# Patient Record
Sex: FEMALE | Race: BLACK OR AFRICAN AMERICAN | NOT HISPANIC OR LATINO | Employment: FULL TIME | URBAN - METROPOLITAN AREA
[De-identification: names, ages, dates, MRNs, and addresses within clinical notes are randomized per-mention and may not be internally consistent; named-entity substitution may affect disease eponyms.]

---

## 2017-03-14 ENCOUNTER — HOSPITAL ENCOUNTER (EMERGENCY)
Facility: HOSPITAL | Age: 52
Discharge: HOME | End: 2017-03-14
Attending: EMERGENCY MEDICINE
Payer: COMMERCIAL

## 2017-03-14 VITALS
HEIGHT: 60 IN | TEMPERATURE: 99 F | BODY MASS INDEX: 30.63 KG/M2 | SYSTOLIC BLOOD PRESSURE: 181 MMHG | RESPIRATION RATE: 18 BRPM | HEART RATE: 84 BPM | WEIGHT: 156 LBS | OXYGEN SATURATION: 97 % | DIASTOLIC BLOOD PRESSURE: 81 MMHG

## 2017-03-14 DIAGNOSIS — K52.9 ENTERITIS: Primary | ICD-10-CM

## 2017-03-14 DIAGNOSIS — N94.89 ADNEXAL MASS: ICD-10-CM

## 2017-03-14 LAB
ALBUMIN SERPL BCP-MCNC: 3.3 G/DL
ALP SERPL-CCNC: 119 U/L
ALT SERPL W/O P-5'-P-CCNC: 16 U/L
ANION GAP SERPL CALC-SCNC: 8 MMOL/L
AST SERPL-CCNC: 19 U/L
BACTERIA #/AREA URNS HPF: NORMAL /HPF
BASOPHILS # BLD AUTO: 0.02 K/UL
BASOPHILS NFR BLD: 0.2 %
BILIRUB SERPL-MCNC: 0.4 MG/DL
BILIRUB UR QL STRIP: NEGATIVE
BUN SERPL-MCNC: 6 MG/DL
CALCIUM SERPL-MCNC: 9.3 MG/DL
CHLORIDE SERPL-SCNC: 105 MMOL/L
CLARITY UR: ABNORMAL
CO2 SERPL-SCNC: 30 MMOL/L
COLOR UR: YELLOW
CREAT SERPL-MCNC: 0.8 MG/DL
DIFFERENTIAL METHOD: ABNORMAL
EOSINOPHIL # BLD AUTO: 0.3 K/UL
EOSINOPHIL NFR BLD: 3 %
ERYTHROCYTE [DISTWIDTH] IN BLOOD BY AUTOMATED COUNT: 14.3 %
EST. GFR  (AFRICAN AMERICAN): >60 ML/MIN/1.73 M^2
EST. GFR  (NON AFRICAN AMERICAN): >60 ML/MIN/1.73 M^2
GLUCOSE SERPL-MCNC: 119 MG/DL
GLUCOSE UR QL STRIP: NEGATIVE
HCT VFR BLD AUTO: 43.3 %
HGB BLD-MCNC: 14.4 G/DL
HGB UR QL STRIP: ABNORMAL
KETONES UR QL STRIP: NEGATIVE
LEUKOCYTE ESTERASE UR QL STRIP: NEGATIVE
LIPASE SERPL-CCNC: 10 U/L
LYMPHOCYTES # BLD AUTO: 2.9 K/UL
LYMPHOCYTES NFR BLD: 34.3 %
MCH RBC QN AUTO: 31.8 PG
MCHC RBC AUTO-ENTMCNC: 33.3 %
MCV RBC AUTO: 96 FL
MICROSCOPIC COMMENT: NORMAL
MONOCYTES # BLD AUTO: 0.5 K/UL
MONOCYTES NFR BLD: 5.2 %
NEUTROPHILS # BLD AUTO: 4.9 K/UL
NEUTROPHILS NFR BLD: 57.3 %
NITRITE UR QL STRIP: NEGATIVE
PH UR STRIP: 5 [PH] (ref 5–8)
PLATELET # BLD AUTO: 294 K/UL
PMV BLD AUTO: 10 FL
POTASSIUM SERPL-SCNC: 3.3 MMOL/L
PROT SERPL-MCNC: 7.7 G/DL
PROT UR QL STRIP: NEGATIVE
RBC # BLD AUTO: 4.53 M/UL
RBC #/AREA URNS HPF: 2 /HPF (ref 0–4)
SODIUM SERPL-SCNC: 143 MMOL/L
SP GR UR STRIP: 1.02 (ref 1–1.03)
SQUAMOUS #/AREA URNS HPF: 20 /HPF
URN SPEC COLLECT METH UR: ABNORMAL
UROBILINOGEN UR STRIP-ACNC: ABNORMAL EU/DL
WBC # BLD AUTO: 8.58 K/UL
WBC #/AREA URNS HPF: 2 /HPF (ref 0–5)

## 2017-03-14 PROCEDURE — 85025 COMPLETE CBC W/AUTO DIFF WBC: CPT

## 2017-03-14 PROCEDURE — 25500020 PHARM REV CODE 255: Performed by: EMERGENCY MEDICINE

## 2017-03-14 PROCEDURE — 83690 ASSAY OF LIPASE: CPT

## 2017-03-14 PROCEDURE — 99284 EMERGENCY DEPT VISIT MOD MDM: CPT

## 2017-03-14 PROCEDURE — 81000 URINALYSIS NONAUTO W/SCOPE: CPT

## 2017-03-14 PROCEDURE — 93005 ELECTROCARDIOGRAM TRACING: CPT

## 2017-03-14 PROCEDURE — 80053 COMPREHEN METABOLIC PANEL: CPT

## 2017-03-14 RX ORDER — ONDANSETRON 4 MG/1
4 TABLET, FILM COATED ORAL EVERY 12 HOURS PRN
Qty: 12 TABLET | Refills: 0 | Status: SHIPPED | OUTPATIENT
Start: 2017-03-14

## 2017-03-14 RX ORDER — DICYCLOMINE HYDROCHLORIDE 20 MG/1
20 TABLET ORAL 4 TIMES DAILY
Qty: 20 TABLET | Refills: 0 | Status: SHIPPED | OUTPATIENT
Start: 2017-03-14 | End: 2017-04-13

## 2017-03-14 RX ADMIN — IOHEXOL 100 ML: 350 INJECTION, SOLUTION INTRAVENOUS at 05:03

## 2017-03-14 NOTE — ED PROVIDER NOTES
Encounter Date: 3/14/2017       History     Chief Complaint   Patient presents with    Abdominal Pain     generalized abdominal pain since yesterday, cramping pain; also has foul smelling urine     Review of patient's allergies indicates:  No Known Allergies  HPI Comments: 57 year old female with Hx of HTN c/o generalized abd pain that began 2 days ago and one episode of vomiting. Also c/o malodorous urine. Pt denies fever/chills, diarrhea, constipation, vaginal discharge, chest pain, back pain.    The history is provided by the patient.     Past Medical History:   Diagnosis Date    GERD (gastroesophageal reflux disease)      Past Surgical History:   Procedure Laterality Date    gall stone removal      HYSTERECTOMY       History reviewed. No pertinent family history.  Social History   Substance Use Topics    Smoking status: Current Some Day Smoker     Packs/day: 0.50    Smokeless tobacco: None    Alcohol use No     Review of Systems   Constitutional: Negative for chills, fatigue and fever.   HENT: Negative for ear pain, rhinorrhea and sinus pressure.    Eyes: Negative for photophobia and pain.   Respiratory: Negative for apnea, cough, choking, chest tightness, shortness of breath, wheezing and stridor.    Cardiovascular: Negative for chest pain, palpitations and leg swelling.   Gastrointestinal: Positive for abdominal pain and vomiting. Negative for abdominal distention, blood in stool, constipation, diarrhea, nausea and rectal pain.   Genitourinary: Negative for difficulty urinating, dyspareunia, dysuria, flank pain, frequency, pelvic pain, urgency, vaginal bleeding and vaginal discharge.   Musculoskeletal: Negative for arthralgias, back pain, gait problem, joint swelling, myalgias, neck pain and neck stiffness.   Skin: Negative for color change, pallor and rash.   Neurological: Negative for dizziness, syncope and numbness.   Hematological: Negative for adenopathy.   Psychiatric/Behavioral: Negative for  agitation. The patient is not nervous/anxious.        Physical Exam   Initial Vitals   BP Pulse Resp Temp SpO2   03/14/17 1440 03/14/17 1440 03/14/17 1440 03/14/17 1440 03/14/17 1440   136/76 95 17 97.9 °F (36.6 °C) 95 %     Physical Exam    Nursing note and vitals reviewed.  Constitutional: She appears well-developed and well-nourished. She is not diaphoretic. No distress.   HENT:   Head: Normocephalic and atraumatic.   Right Ear: External ear normal.   Left Ear: External ear normal.   Nose: Nose normal.   Mouth/Throat: Oropharynx is clear and moist.   Eyes: Conjunctivae and EOM are normal. Pupils are equal, round, and reactive to light. Right eye exhibits no discharge. Left eye exhibits no discharge. No scleral icterus.   Neck: Normal range of motion. Neck supple. No thyromegaly present. No tracheal deviation present. No JVD present.   Cardiovascular: Normal rate, regular rhythm, normal heart sounds and intact distal pulses. Exam reveals no gallop and no friction rub.    No murmur heard.  Pulmonary/Chest: Breath sounds normal. No stridor. No respiratory distress. She has no wheezes. She has no rhonchi. She has no rales. She exhibits no tenderness.   Abdominal: Soft. Bowel sounds are normal. She exhibits no distension and no mass. There is tenderness (RLQ; Positive Psoas Sign). There is no rebound and no guarding.   Musculoskeletal: Normal range of motion. She exhibits no edema or tenderness.   Lymphadenopathy:     She has no cervical adenopathy.   Neurological: She is alert and oriented to person, place, and time. She has normal strength and normal reflexes. She displays normal reflexes. No cranial nerve deficit or sensory deficit.   Skin: Skin is warm and dry. No rash and no abscess noted. No erythema. No pallor.   Psychiatric: She has a normal mood and affect. Her behavior is normal. Judgment and thought content normal.         ED Course   Procedures  Labs Reviewed   CBC W/ AUTO DIFFERENTIAL - Abnormal; Notable  for the following:        Result Value    MCH 31.8 (*)     All other components within normal limits    Narrative:     For upper or mid abdominal pain.   COMPREHENSIVE METABOLIC PANEL - Abnormal; Notable for the following:     Potassium 3.3 (*)     CO2 30 (*)     Glucose 119 (*)     Albumin 3.3 (*)     All other components within normal limits    Narrative:     For upper or mid abdominal pain.   URINALYSIS - Abnormal; Notable for the following:     Appearance, UA Hazy (*)     Occult Blood UA 1+ (*)     Urobilinogen, UA 2.0-3.0 (*)     All other components within normal limits   LIPASE    Narrative:     For upper or mid abdominal pain.   URINALYSIS MICROSCOPIC     EKG Readings: (Independently Interpreted)   Rhythm: Normal Sinus Rhythm. Ectopy: PVCs. Conduction: Normal. ST Segments: Normal ST Segments. T Waves: Normal. Clinical Impression: Normal Sinus Rhythm with PVCs          Medical Decision Making:   ED Management:  This is a 52 year old female presenting with diffuse abdominal pain x 2 days and one episode of vomiting and malodorous urine. Denies fever, dysuria, diarrhea, constipation, vaginal discharge, chest pain, back pain. On exam there is RLQ abd tenderness. No CVA tenderness. No palpable intraabdominal mass. Positive Psoas Sign. Negative Natarajan's Sign. Lab results are unremarkable. CT shows enteritis and a 7.0 x 6.0 cm left adnexal mass. I suspect enteritis and adnexal mass. I considered but doubt appendicitis, adnexal torsion, PID, colitis. Instructed patient to follow up with Ob/Gyn for adnexal mass in the next 7 days. Pt discharged home with bentyl and Zofran and instructions for follow up and supportive care. ED return precautions given and understood by patient. Pt verbalized understanding of all instructions given. This case was discussed with Francis Barreto MD who agreed with the assessment and plan.  Other:   I have discussed this case with another health care provider.              Attending  Attestation:     Physician Attestation Statement for NP/PA:   I discussed this assessment and plan of this patient with the NP/PA, but I did not personally examine the patient. The face to face encounter was performed by the NP/PA.    Other NP/PA Attestation Additions:      Medical Decision Makin yo F p/w diffuse abd pain for 2 days, with vomiting x 1 yesterday, no nausea currently. On exam, RLQ TTP.  Labs unremarkable. Will CT to screen for appendicitis, colitis, other emergent abdominal pathology.     I have personally reviewed and interpreted the patient's CT abdomen and there are findings suggestive of enteritis  There is also a large left adnexal cyst.    Patient's symptoms likely secondary to nonspecific enteritis.  Will treat this supportively and also recommend follow-up with OB/GYN.                 ED Course     Clinical Impression:   The primary encounter diagnosis was Enteritis. A diagnosis of Adnexal mass was also pertinent to this visit.    Disposition:   Disposition: Discharged  Condition: Stable       Javy Diaz NP  17 [phone]       Francis Barreto III, MD  179

## 2017-03-14 NOTE — ED AVS SNAPSHOT
[facility]  [place] [address]               Destiney M Paul   3/14/2017  4:14 PM   ED    Description:  Female : 1965   Department:  [facility]           Your Care was Coordinated By:     Provider Role From To    Francis Barreto III, MD Attending Provider 17 0616 --    Javy Diaz NP Nurse Practitioner 17 [id number] --      Reason for Visit     Abdominal Pain           Diagnoses this Visit        Comments    Enteritis    -  Primary     Adnexal mass           ED Disposition     ED Disposition Condition Comment    Discharge             To Do List           Follow-up Information     Go to [facility].    Specialty:  Emergency Medicine    Why:  For further evaluation, As needed    Contact information:    [place] [address] [phone]        Follow up with [facility]. Schedule an appointment as soon as possible for a visit in 1 week.    Specialty:  Obstetrics and Gynecology    Why:  For further evaluation of CT finding    Contact information:    [place] [facility] [place] [address] [phone]         These Medications        Disp Refills Start End    dicyclomine (BENTYL) 20 mg tablet 20 tablet 0 3/14/2017 2017    Take 1 tablet (20 mg total) by mouth 4 (four) times daily. - Oral    ondansetron (ZOFRAN) 4 MG tablet 12 tablet 0 3/14/2017     Take 1 tablet (4 mg total) by mouth every 12 (twelve) hours as needed for Nausea. - Oral      [facility]     [facility] -  Assistance  Registered nurses in the [facility] provide clinical advisement, health education, appointment booking, and other advisory services.  Call for this free service at [phone].             Medications           Message regarding Medications     Verify the changes and/or additions to your medication regime listed below are  the same as discussed with your clinician today.  If any of these changes or additions are incorrect, please notify your healthcare provider.        START taking these NEW medications        Refills    dicyclomine (BENTYL) 20 mg tablet 0    Sig: Take 1 tablet (20 mg total) by mouth 4 (four) times daily.    Class: Print    Route: Oral    ondansetron (ZOFRAN) 4 MG tablet 0    Sig: Take 1 tablet (4 mg total) by mouth every 12 (twelve) hours as needed for Nausea.    Class: Print    Route: Oral      These medications were administered today        Dose Freq    omnipaque 350 iohexol 100 mL 100 mL IMG once as needed    Sig: Inject 100 mLs into the vein ONCE PRN for contrast.    Class: Normal    Route: Intravenous           Verify that the below list of medications is an accurate representation of the medications you are currently taking.  If none reported, the list may be blank. If incorrect, please contact your healthcare provider. Carry this list with you in case of emergency.           Current Medications     dicyclomine (BENTYL) 20 mg tablet Take 1 tablet (20 mg total) by mouth 4 (four) times daily.    ondansetron (ZOFRAN) 4 MG tablet Take 1 tablet (4 mg total) by mouth every 12 (twelve) hours as needed for Nausea.           Clinical Reference Information           Your Vitals Were     BP Pulse Temp Resp Height Weight    136/76 (BP Location: Right arm, Patient Position: Sitting) 95 97.9 °F (36.6 °C) (Oral) 17 5' (1.524 m) 70.8 kg (156 lb)    SpO2 BMI             95% 30.47 kg/m2         Allergies as of 3/14/2017     No Known Allergies      Immunizations Administered on Date of Encounter - 3/14/2017     None      ED Micro, Lab, POCT     Start Ordered       Status Ordering Provider    03/14/17 1449 03/14/17 1449  CBC auto differential  Once      Final result     03/14/17 1449 03/14/17 1449  Comprehensive metabolic panel  Once      Final result     03/14/17 1449 03/14/17 1449  Urinalysis  Once      Final result      03/14/17 1449 03/14/17 1449  Lipase  Once     Comments:  For upper or mid abdominal pain.    Final result     03/14/17 1449 03/14/17 1449  Urinalysis Microscopic  Once      Final result       ED Imaging Orders     Start Ordered       Status Ordering Provider    03/14/17 1638 03/14/17 1638  CT Abdomen Pelvis With Contrast  1 time imaging      Final result         Discharge Instructions       [facility] or your [facility]/Gyn in the next week for adnexal mass.    Return to the emergency department for any persistent vomiting, fever greater than 100.4 degrees, worsening abdominal pain, or new or worsening symptoms.      Discharge References/Attachments     GASTROENTERITIS, NONINFECTIOUS (ENGLISH)      [facility] Sign-Up     Activating your [facility] account is as easy as 1-2-3!     1) Visit [email or web address], select Sign Up Now, enter this activation code and your date of birth, then select Next.  [email or web address]  Expires: 4/28/2017  7:11 PM      2) Create a username and password to use when you visit [facility] in the future and select a security question in case you lose your password and select Next.    3) Enter your e-mail address and click Sign Up!    Additional Information  If you have questions, please e-mail [email or web address] or call [phone] to talk to our [facility] staff. Remember, [facility] is NOT to be used for urgent needs. For medical emergencies, dial 911.          [facility] complies with applicable Federal civil rights laws and does not discriminate on the basis of race, color, national origin, age, disability, or sex.        Language Assistance Services     ATTENTION: Language assistance services are available, free of charge. Please call [phone].      ATENCIÓN: Si habla español, tiene a morrell disposición servicios gratuitos de asistencia lingüística. Llame al [phone].     CHÚ Ý: N?u b?n nói Ti?ng Vi?t, có các d?ch v? h? tr? ngôn ng? mi?n phí dành cho  b?n. G?i s? [phone].

## 2017-03-14 NOTE — ED TRIAGE NOTES
Patient states that she started with abdominal pain yesterday; also has c/o of nausea and vomiting. Patient also states that she noticed an odor to her urine yesterday as well.

## 2017-03-15 NOTE — DISCHARGE INSTRUCTIONS
Follow up with [facility] or your Ob/Gyn in the next week for adnexal mass.    Return to the emergency department for any persistent vomiting, fever greater than 100.4 degrees, worsening abdominal pain, or new or worsening symptoms.

## 2017-05-11 PROBLEM — N83.209 OTHER AND UNSPECIFIED OVARIAN CYST: Status: ACTIVE | Noted: 2017-05-11
